# Patient Record
Sex: FEMALE | Race: WHITE | NOT HISPANIC OR LATINO | Employment: STUDENT | URBAN - METROPOLITAN AREA
[De-identification: names, ages, dates, MRNs, and addresses within clinical notes are randomized per-mention and may not be internally consistent; named-entity substitution may affect disease eponyms.]

---

## 2024-08-31 ENCOUNTER — OFFICE VISIT (OUTPATIENT)
Dept: URGENT CARE | Facility: CLINIC | Age: 29
End: 2024-08-31
Payer: COMMERCIAL

## 2024-08-31 VITALS
RESPIRATION RATE: 18 BRPM | BODY MASS INDEX: 26.13 KG/M2 | WEIGHT: 142 LBS | DIASTOLIC BLOOD PRESSURE: 65 MMHG | HEIGHT: 62 IN | TEMPERATURE: 96.7 F | SYSTOLIC BLOOD PRESSURE: 127 MMHG | HEART RATE: 77 BPM | OXYGEN SATURATION: 99 %

## 2024-08-31 DIAGNOSIS — L23.7 POISON IVY DERMATITIS: Primary | ICD-10-CM

## 2024-08-31 PROCEDURE — 99213 OFFICE O/P EST LOW 20 MIN: CPT | Performed by: NURSE PRACTITIONER

## 2024-08-31 RX ORDER — PREDNISONE 20 MG/1
20 TABLET ORAL 2 TIMES DAILY WITH MEALS
Qty: 10 TABLET | Refills: 0 | Status: SHIPPED | OUTPATIENT
Start: 2024-08-31 | End: 2024-09-05

## 2024-08-31 NOTE — PROGRESS NOTES
[facility]        NAME: Falguni Wisdom is a 29 y.o. female  : 1995    MRN: [id number]  DATE: 2024  TIME: 10:03 AM    Assessment and Plan   Poison ivy dermatitis [L23.7]  1. Poison ivy dermatitis  predniSONE 20 mg tablet            Patient Instructions       Take med as prescribed  Follow up with PCP in 3-5 days.  Proceed to  ER if symptoms worsen.    If tests have been performed at [facility], our office will contact you with results if changes need to be made to the care plan discussed with you at the visit.  You can review your full results on [facility].    Chief Complaint     Chief Complaint   Patient presents with    Rash     Rash by R eye started yesterday          History of Present Illness       HPI  Reports poison ivy to the face. Started yesterday while working in the yard. Denies problems with breathing and swallowing.       Review of Systems   Review of Systems   Constitutional:  Negative for fever.   HENT:  Negative for trouble swallowing.    Eyes:  Negative for visual disturbance.   Respiratory:  Negative for shortness of breath.    Skin:  Positive for rash (face). Negative for wound.         Current Medications       Current Outpatient Medications:     predniSONE 20 mg tablet, Take 1 tablet (20 mg total) by mouth 2 (two) times a day with meals for 5 days, Disp: 10 tablet, Rfl: 0    Current Allergies     Allergies as of 2024 - Reviewed 2024   Allergen Reaction Noted    Lactose - food allergy Other (See Comments) 2024    Cefazolin Rash 2024    Cefdinir Rash 2024    Penicillins Rash 2024            The following portions of the patient's history were reviewed and updated as appropriate: allergies, current medications, past family history, past medical history, past social history, past surgical history and problem list.     Past Medical History:   Diagnosis Date    Allergic     Arthritis     Asthma     Migraines     Seizures (HCC)   "      Past Surgical History:   Procedure Laterality Date    ADENOIDECTOMY      TONSILLECTOMY         History reviewed. No pertinent family history.      Medications have been verified.        Objective   /65   Pulse 77   Temp (!) 96.7 °F (35.9 °C)   Resp 18   Ht 5' 2\" (1.575 m)   Wt 64.4 kg (142 lb)   LMP 08/11/2024 (Approximate)   SpO2 99%   BMI 25.97 kg/m²   Patient's last menstrual period was 08/11/2024 (approximate).       Physical Exam     Physical Exam  Eyes:      Extraocular Movements: Extraocular movements intact.      Conjunctiva/sclera: Conjunctivae normal.      Pupils: Pupils are equal, round, and reactive to light.   Cardiovascular:      Rate and Rhythm: Regular rhythm.      Heart sounds: Normal heart sounds.   Pulmonary:      Effort: Pulmonary effort is normal.      Breath sounds: Normal breath sounds.   Skin:     Findings: Rash (poison ivy rash on the right side of the face. Less on the right earlobe. No drainage. a bit of crusting on the right ear lobe) present.                   "